# Patient Record
Sex: MALE | Race: BLACK OR AFRICAN AMERICAN | URBAN - METROPOLITAN AREA
[De-identification: names, ages, dates, MRNs, and addresses within clinical notes are randomized per-mention and may not be internally consistent; named-entity substitution may affect disease eponyms.]

---

## 2022-12-21 ENCOUNTER — OFFICE VISIT (OUTPATIENT)
Dept: URBAN - METROPOLITAN AREA CLINIC 74 | Facility: CLINIC | Age: 32
End: 2022-12-21

## 2023-02-08 ENCOUNTER — OFFICE VISIT (OUTPATIENT)
Dept: URBAN - METROPOLITAN AREA CLINIC 74 | Facility: CLINIC | Age: 33
End: 2023-02-08

## 2023-03-02 ENCOUNTER — LAB OUTSIDE AN ENCOUNTER (OUTPATIENT)
Dept: URBAN - METROPOLITAN AREA CLINIC 40 | Facility: CLINIC | Age: 33
End: 2023-03-02

## 2023-03-02 ENCOUNTER — WEB ENCOUNTER (OUTPATIENT)
Dept: URBAN - METROPOLITAN AREA CLINIC 40 | Facility: CLINIC | Age: 33
End: 2023-03-02

## 2023-03-02 ENCOUNTER — OFFICE VISIT (OUTPATIENT)
Dept: URBAN - METROPOLITAN AREA CLINIC 40 | Facility: CLINIC | Age: 33
End: 2023-03-02
Payer: SELF-PAY

## 2023-03-02 VITALS
WEIGHT: 134.8 LBS | TEMPERATURE: 97.9 F | BODY MASS INDEX: 24.8 KG/M2 | HEART RATE: 68 BPM | SYSTOLIC BLOOD PRESSURE: 124 MMHG | HEIGHT: 62 IN | DIASTOLIC BLOOD PRESSURE: 64 MMHG

## 2023-03-02 DIAGNOSIS — K62.5 RECTAL BLEEDING: ICD-10-CM

## 2023-03-02 PROCEDURE — 99202 OFFICE O/P NEW SF 15 MIN: CPT | Performed by: INTERNAL MEDICINE

## 2023-03-02 NOTE — HPI-TODAY'S VISIT:
No recent EPIC/[facility] notes Not seen at [facility] prior Pt noted BRBPR 12/8/22, heavy bleeding, painless He noted another episode this week, painlesss bleeding No other red flag s/s, bleeding with BMs, no abd pain Saw urgent care a few weeks ago, no BOY, Rx for Proctofoam, GI referral Cell phone pictures reviewed, overt blood in toilet and BM brown

## 2023-03-02 NOTE — PHYSICAL EXAM GASTROINTESTINAL
Abdomen , soft, nontender, nondistended , no guarding or rigidity , no masses palpable , normal bowel sounds , Liver and Spleen, no hepatosplenomegaly , liver nontender , BOY negative, no fissure or blood or overt hemorrhoids or masses Attending Only

## 2023-04-04 ENCOUNTER — TELEPHONE ENCOUNTER (OUTPATIENT)
Dept: URBAN - METROPOLITAN AREA CLINIC 74 | Facility: CLINIC | Age: 33
End: 2023-04-04

## 2023-04-20 ENCOUNTER — CLAIMS CREATED FROM THE CLAIM WINDOW (OUTPATIENT)
Dept: URBAN - METROPOLITAN AREA CLINIC 4 | Facility: CLINIC | Age: 33
End: 2023-04-20
Payer: SELF-PAY

## 2023-04-20 ENCOUNTER — OFFICE VISIT (OUTPATIENT)
Dept: URBAN - METROPOLITAN AREA SURGERY CENTER 30 | Facility: SURGERY CENTER | Age: 33
End: 2023-04-20
Payer: SELF-PAY

## 2023-04-20 DIAGNOSIS — K62.5 ANAL BLEEDING: ICD-10-CM

## 2023-04-20 DIAGNOSIS — Z12.11 COLON CANCER SCREENING: ICD-10-CM

## 2023-04-20 DIAGNOSIS — K64.1 BLEEDING GRADE II HEMORRHOIDS: ICD-10-CM

## 2023-04-20 PROCEDURE — 46221 LIGATION OF HEMORRHOID(S): CPT | Performed by: INTERNAL MEDICINE

## 2023-04-20 PROCEDURE — PINCL ALL INCLUSIVE: Performed by: PATHOLOGY

## 2023-04-20 PROCEDURE — G8907 PT DOC NO EVENTS ON DISCHARG: HCPCS | Performed by: INTERNAL MEDICINE

## 2023-04-20 PROCEDURE — 45378 DIAGNOSTIC COLONOSCOPY: CPT | Performed by: INTERNAL MEDICINE

## 2023-05-05 ENCOUNTER — DASHBOARD ENCOUNTERS (OUTPATIENT)
Age: 33
End: 2023-05-05

## 2023-05-11 ENCOUNTER — OFFICE VISIT (OUTPATIENT)
Dept: URBAN - METROPOLITAN AREA CLINIC 40 | Facility: CLINIC | Age: 33
End: 2023-05-11

## 2023-05-11 ENCOUNTER — OFFICE VISIT (OUTPATIENT)
Dept: URBAN - METROPOLITAN AREA CLINIC 40 | Facility: CLINIC | Age: 33
End: 2023-05-11
Payer: SELF-PAY

## 2023-05-11 VITALS
DIASTOLIC BLOOD PRESSURE: 82 MMHG | TEMPERATURE: 97.5 F | HEART RATE: 63 BPM | HEIGHT: 62 IN | WEIGHT: 133.4 LBS | BODY MASS INDEX: 24.55 KG/M2 | SYSTOLIC BLOOD PRESSURE: 110 MMHG

## 2023-05-11 DIAGNOSIS — K64.1 GRADE II HEMORRHOIDS: ICD-10-CM

## 2023-05-11 PROCEDURE — 46221 LIGATION OF HEMORRHOID(S): CPT | Performed by: INTERNAL MEDICINE

## 2023-05-11 NOTE — HPI-TODAY'S VISIT:
Follow up hemorrhoids. Colonoscopy 3/23 completed and negative other than hemorrhoids as etiology for rectal bleeding. Banded 1/3 LL at that time. Pt is self pay.